# Patient Record
Sex: MALE | Race: WHITE | URBAN - METROPOLITAN AREA
[De-identification: names, ages, dates, MRNs, and addresses within clinical notes are randomized per-mention and may not be internally consistent; named-entity substitution may affect disease eponyms.]

---

## 2018-04-20 ENCOUNTER — HISTORICAL (OUTPATIENT)
Dept: ADMINISTRATIVE | Facility: HOSPITAL | Age: 63
End: 2018-04-20

## 2018-04-20 LAB
ALBUMIN SERPL-MCNC: 4.4 GM/DL (ref 3.4–5)
ALBUMIN/GLOB SERPL: 1.52 {RATIO} (ref 1.5–2.5)
ALP SERPL-CCNC: 66 UNIT/L (ref 38–126)
ALT SERPL-CCNC: 28 UNIT/L (ref 7–52)
AST SERPL-CCNC: 23 UNIT/L (ref 15–37)
BILIRUB SERPL-MCNC: 0.4 MG/DL (ref 0.2–1)
BILIRUBIN DIRECT+TOT PNL SERPL-MCNC: 0.1 MG/DL (ref 0–0.5)
BUN SERPL-MCNC: 17 MG/DL (ref 7–18)
CALCIUM SERPL-MCNC: 9.5 MG/DL (ref 8.5–10)
CHLORIDE SERPL-SCNC: 101 MMOL/L (ref 98–107)
CHOLEST SERPL-MCNC: 194 MG/DL (ref 0–200)
CHOLEST/HDLC SERPL: 5.2 {RATIO}
CO2 SERPL-SCNC: 29 MMOL/L (ref 21–32)
CREAT SERPL-MCNC: 1.13 MG/DL (ref 0.6–1.3)
CREAT/UREA NIT SERPL: 15
EST. AVERAGE GLUCOSE BLD GHB EST-MCNC: 117 MG/DL
GGT SERPL-CCNC: 70 UNIT/L (ref 5–85)
GLOBULIN SER-MCNC: 2.9 GM/DL (ref 1.2–3)
GLUCOSE SERPL-MCNC: 105 MG/DL (ref 74–106)
HBA1C MFR BLD: 5.7 % (ref 4.4–6.4)
HDLC SERPL-MCNC: 37 MG/DL (ref 35–60)
LDH SERPL-CCNC: 170 UNIT/L (ref 140–271)
LDLC SERPL CALC-MCNC: 104 MG/DL (ref 0–129)
POTASSIUM SERPL-SCNC: 4.8 MMOL/L (ref 3.5–5.1)
PROT SERPL-MCNC: 7.3 GM/DL (ref 6.4–8.2)
SODIUM SERPL-SCNC: 135 MMOL/L (ref 136–145)
TRIGL SERPL-MCNC: 258 MG/DL (ref 30–150)
VLDLC SERPL CALC-MCNC: 51.6 MG/DL

## 2018-05-23 ENCOUNTER — HISTORICAL (OUTPATIENT)
Dept: ADMINISTRATIVE | Facility: HOSPITAL | Age: 63
End: 2018-05-23

## 2018-10-17 ENCOUNTER — HISTORICAL (OUTPATIENT)
Dept: ADMINISTRATIVE | Facility: HOSPITAL | Age: 63
End: 2018-10-17

## 2018-10-17 LAB
APPEARANCE, UA: CLEAR
BACTERIA #/AREA URNS AUTO: NORMAL /HPF
BILIRUB UR QL STRIP: NEGATIVE MG/DL
COLOR UR: YELLOW
GLUCOSE (UA): NEGATIVE MG/DL
HGB UR QL STRIP: NEGATIVE UNIT/L
KETONES UR QL STRIP: NEGATIVE MG/DL
LEUKOCYTE ESTERASE UR QL STRIP: NEGATIVE UNIT/L
NITRITE UR QL STRIP.AUTO: NEGATIVE
PH UR STRIP: 7 [PH]
PROT UR QL STRIP: NEGATIVE MG/DL
RBC #/AREA URNS HPF: NORMAL /HPF
SP GR UR STRIP: 1.02
SQUAMOUS EPITHELIAL, UA: NORMAL /LPF
UROBILINOGEN UR STRIP-ACNC: 0.2 MG/DL
WBC #/AREA URNS AUTO: NORMAL /[HPF]

## 2018-10-26 ENCOUNTER — HISTORICAL (OUTPATIENT)
Dept: ADMINISTRATIVE | Facility: HOSPITAL | Age: 63
End: 2018-10-26

## 2018-10-26 LAB
ABS NEUT (OLG): 7.1
ALBUMIN SERPL-MCNC: 4.5 GM/DL (ref 3.4–5)
ALBUMIN/GLOB SERPL: 1.8 {RATIO} (ref 1.5–2.5)
ALP SERPL-CCNC: 60 UNIT/L (ref 38–126)
ALT SERPL-CCNC: 40 UNIT/L (ref 7–52)
APPEARANCE, UA: CLEAR
AST SERPL-CCNC: 28 UNIT/L (ref 15–37)
BACTERIA #/AREA URNS AUTO: ABNORMAL /HPF
BILIRUB SERPL-MCNC: 0.5 MG/DL (ref 0.2–1)
BILIRUB UR QL STRIP: NEGATIVE MG/DL
BILIRUBIN DIRECT+TOT PNL SERPL-MCNC: 0.1 MG/DL (ref 0–0.5)
BILIRUBIN DIRECT+TOT PNL SERPL-MCNC: 0.4 MG/DL
BUN SERPL-MCNC: 24 MG/DL (ref 7–18)
CALCIUM SERPL-MCNC: 9.8 MG/DL (ref 8.5–10)
CHLORIDE SERPL-SCNC: 102 MMOL/L (ref 98–107)
CHOLEST SERPL-MCNC: 203 MG/DL (ref 0–200)
CHOLEST/HDLC SERPL: 4.6 {RATIO}
CO2 SERPL-SCNC: 30 MMOL/L (ref 21–32)
COLOR UR: YELLOW
CREAT SERPL-MCNC: 1.13 MG/DL (ref 0.6–1.3)
ERYTHROCYTE [DISTWIDTH] IN BLOOD BY AUTOMATED COUNT: 14.1 % (ref 11.5–17)
EST. AVERAGE GLUCOSE BLD GHB EST-MCNC: 123 MG/DL
GLOBULIN SER-MCNC: 2.5 GM/DL (ref 1.2–3)
GLUCOSE (UA): NEGATIVE MG/DL
GLUCOSE SERPL-MCNC: 109 MG/DL (ref 74–106)
HBA1C MFR BLD: 5.9 % (ref 4.4–6.4)
HCT VFR BLD AUTO: 51.9 % (ref 42–52)
HDLC SERPL-MCNC: 44 MG/DL (ref 35–60)
HGB BLD-MCNC: 16.7 GM/DL (ref 14–18)
HGB UR QL STRIP: NEGATIVE UNIT/L
KETONES UR QL STRIP: NEGATIVE MG/DL
LDLC SERPL CALC-MCNC: 117 MG/DL (ref 0–129)
LEUKOCYTE ESTERASE UR QL STRIP: NEGATIVE UNIT/L
LYMPHOCYTES # BLD AUTO: 4 X10(3)/MCL (ref 0.6–3.4)
LYMPHOCYTES NFR BLD AUTO: 31.5 % (ref 13–40)
MCH RBC QN AUTO: 31.7 PG (ref 27–31.2)
MCHC RBC AUTO-ENTMCNC: 32 GM/DL (ref 32–36)
MCV RBC AUTO: 99 FL (ref 80–94)
MONOCYTES # BLD AUTO: 1.6 X10(3)/MCL (ref 0–1.8)
MONOCYTES NFR BLD AUTO: 12.3 % (ref 0.1–24)
NEUTROPHILS NFR BLD AUTO: 56.2 % (ref 47–80)
NITRITE UR QL STRIP.AUTO: NEGATIVE
PH UR STRIP: 6.5 [PH]
PLATELET # BLD AUTO: 302 X10(3)/MCL (ref 130–400)
PMV BLD AUTO: 8.8 FL
POTASSIUM SERPL-SCNC: 4.4 MMOL/L (ref 3.5–5.1)
PROT SERPL-MCNC: 7 GM/DL (ref 6.4–8.2)
PROT UR QL STRIP: ABNORMAL MG/DL
PSA SERPL-MCNC: 0.51 NG/ML (ref 0–4.5)
RBC # BLD AUTO: 5.26 X10(6)/MCL (ref 4.7–6.1)
RBC #/AREA URNS HPF: ABNORMAL /HPF
SODIUM SERPL-SCNC: 138 MMOL/L (ref 136–145)
SP GR UR STRIP: 1.02
SQUAMOUS EPITHELIAL, UA: ABNORMAL /LPF
TRIGL SERPL-MCNC: 242 MG/DL (ref 30–150)
TSH SERPL-ACNC: 0.59 MIU/ML (ref 0.35–4.94)
UROBILINOGEN UR STRIP-ACNC: 0.2 MG/DL
VLDLC SERPL CALC-MCNC: 48.4 MG/DL
WBC # SPEC AUTO: 12.7 X10(3)/MCL (ref 4.5–11.5)
WBC #/AREA URNS AUTO: ABNORMAL /[HPF]

## 2019-04-08 ENCOUNTER — HISTORICAL (OUTPATIENT)
Dept: ADMINISTRATIVE | Facility: HOSPITAL | Age: 64
End: 2019-04-08

## 2019-04-08 LAB
ABS NEUT (OLG): 6.8 X10(3)/MCL (ref 2.1–9.2)
ALBUMIN SERPL-MCNC: 4.3 GM/DL (ref 3.4–5)
ALBUMIN/GLOB SERPL: 2.15 {RATIO} (ref 1.5–2.5)
ALP SERPL-CCNC: 52 UNIT/L (ref 38–126)
ALT SERPL-CCNC: 35 UNIT/L (ref 7–52)
AST SERPL-CCNC: 29 UNIT/L (ref 15–37)
BILIRUB SERPL-MCNC: 0.4 MG/DL (ref 0.2–1)
BILIRUBIN DIRECT+TOT PNL SERPL-MCNC: 0.1 MG/DL (ref 0–0.5)
BILIRUBIN DIRECT+TOT PNL SERPL-MCNC: 0.3 MG/DL
BUN SERPL-MCNC: 21 MG/DL (ref 7–18)
CALCIUM SERPL-MCNC: 9.3 MG/DL (ref 8.5–10)
CHLORIDE SERPL-SCNC: 102 MMOL/L (ref 98–107)
CHOLEST SERPL-MCNC: 219 MG/DL (ref 0–200)
CHOLEST/HDLC SERPL: 6.4 {RATIO}
CO2 SERPL-SCNC: 31 MMOL/L (ref 21–32)
CREAT SERPL-MCNC: 1.11 MG/DL (ref 0.6–1.3)
ERYTHROCYTE [DISTWIDTH] IN BLOOD BY AUTOMATED COUNT: 13.9 % (ref 11.5–17)
EST. AVERAGE GLUCOSE BLD GHB EST-MCNC: 117 MG/DL
GLOBULIN SER-MCNC: 2 GM/DL (ref 1.2–3)
GLUCOSE SERPL-MCNC: 106 MG/DL (ref 74–106)
HBA1C MFR BLD: 5.7 % (ref 4.4–6.4)
HCT VFR BLD AUTO: 48.4 % (ref 42–52)
HDLC SERPL-MCNC: 34 MG/DL (ref 35–60)
HGB BLD-MCNC: 16.3 GM/DL (ref 14–18)
LDLC SERPL CALC-MCNC: 116 MG/DL (ref 0–129)
LYMPHOCYTES # BLD AUTO: 3.6 X10(3)/MCL (ref 0.6–3.4)
LYMPHOCYTES NFR BLD AUTO: 31.8 % (ref 13–40)
MCH RBC QN AUTO: 31.4 PG (ref 27–31.2)
MCHC RBC AUTO-ENTMCNC: 34 GM/DL (ref 32–36)
MCV RBC AUTO: 93 FL (ref 80–94)
MONOCYTES # BLD AUTO: 1 X10(3)/MCL (ref 0.1–1.3)
MONOCYTES NFR BLD AUTO: 9.1 % (ref 0.1–24)
NEUTROPHILS NFR BLD AUTO: 59.1 % (ref 47–80)
PLATELET # BLD AUTO: 316 X10(3)/MCL (ref 130–400)
PMV BLD AUTO: 8.8 FL (ref 9.4–12.4)
POTASSIUM SERPL-SCNC: 4.9 MMOL/L (ref 3.5–5.1)
PROT SERPL-MCNC: 6.3 GM/DL (ref 6.4–8.2)
RBC # BLD AUTO: 5.19 X10(6)/MCL (ref 4.7–6.1)
SODIUM SERPL-SCNC: 137 MMOL/L (ref 136–145)
TRIGL SERPL-MCNC: 336 MG/DL (ref 30–150)
VLDLC SERPL CALC-MCNC: 67.2 MG/DL
WBC # SPEC AUTO: 11.4 X10(3)/MCL (ref 4.5–11.5)

## 2019-06-11 ENCOUNTER — HISTORICAL (OUTPATIENT)
Dept: ADMINISTRATIVE | Facility: HOSPITAL | Age: 64
End: 2019-06-11

## 2019-06-11 LAB
ALBUMIN SERPL-MCNC: 4.2 GM/DL (ref 3.4–5)
ALBUMIN/GLOB SERPL: 1.56 {RATIO} (ref 1.5–2.5)
ALP SERPL-CCNC: 59 UNIT/L (ref 38–126)
ALT SERPL-CCNC: 23 UNIT/L (ref 7–52)
AST SERPL-CCNC: 22 UNIT/L (ref 15–37)
BILIRUB SERPL-MCNC: 0.4 MG/DL (ref 0.2–1)
BILIRUBIN DIRECT+TOT PNL SERPL-MCNC: 0.1 MG/DL (ref 0–0.5)
BILIRUBIN DIRECT+TOT PNL SERPL-MCNC: 0.3 MG/DL
BUN SERPL-MCNC: 20 MG/DL (ref 7–18)
CALCIUM SERPL-MCNC: 9.1 MG/DL (ref 8.5–10)
CHLORIDE SERPL-SCNC: 103 MMOL/L (ref 98–107)
CHOLEST SERPL-MCNC: 176 MG/DL (ref 0–200)
CHOLEST/HDLC SERPL: 4.2 {RATIO}
CO2 SERPL-SCNC: 28 MMOL/L (ref 21–32)
CREAT SERPL-MCNC: 1.14 MG/DL (ref 0.6–1.3)
GLOBULIN SER-MCNC: 2.7 GM/DL (ref 1.2–3)
GLUCOSE SERPL-MCNC: 110 MG/DL (ref 74–106)
HDLC SERPL-MCNC: 42 MG/DL (ref 35–60)
LDLC SERPL CALC-MCNC: 99 MG/DL (ref 0–129)
POTASSIUM SERPL-SCNC: 4.9 MMOL/L (ref 3.5–5.1)
PROT SERPL-MCNC: 6.9 GM/DL (ref 6.4–8.2)
SODIUM SERPL-SCNC: 137 MMOL/L (ref 136–145)
TRIGL SERPL-MCNC: 142 MG/DL (ref 30–150)
VLDLC SERPL CALC-MCNC: 28.4 MG/DL

## 2019-10-18 ENCOUNTER — HISTORICAL (OUTPATIENT)
Dept: ADMINISTRATIVE | Facility: HOSPITAL | Age: 64
End: 2019-10-18

## 2019-10-18 LAB
ABS NEUT (OLG): 6.3 X10(3)/MCL (ref 2.1–9.2)
ALBUMIN SERPL-MCNC: 4.4 GM/DL (ref 3.4–5)
ALBUMIN/GLOB SERPL: 1.57 {RATIO} (ref 1.5–2.5)
ALP SERPL-CCNC: 59 UNIT/L (ref 38–126)
ALT SERPL-CCNC: 27 UNIT/L (ref 7–52)
APPEARANCE, UA: CLEAR
AST SERPL-CCNC: 24 UNIT/L (ref 15–37)
BACTERIA #/AREA URNS AUTO: NORMAL /HPF
BILIRUB SERPL-MCNC: 0.4 MG/DL (ref 0.2–1)
BILIRUB UR QL STRIP: NEGATIVE MG/DL
BILIRUBIN DIRECT+TOT PNL SERPL-MCNC: 0.1 MG/DL (ref 0–0.5)
BILIRUBIN DIRECT+TOT PNL SERPL-MCNC: 0.3 MG/DL
BUN SERPL-MCNC: 22 MG/DL (ref 7–18)
CALCIUM SERPL-MCNC: 10 MG/DL (ref 8.5–10)
CHLORIDE SERPL-SCNC: 102 MMOL/L (ref 98–107)
CHOLEST SERPL-MCNC: 185 MG/DL (ref 0–200)
CHOLEST/HDLC SERPL: 4.6 {RATIO}
CO2 SERPL-SCNC: 30 MMOL/L (ref 21–32)
COLOR UR: YELLOW
CREAT SERPL-MCNC: 1.22 MG/DL (ref 0.6–1.3)
CREAT UR-MCNC: 300 MG/DL
ERYTHROCYTE [DISTWIDTH] IN BLOOD BY AUTOMATED COUNT: 14.2 % (ref 11.5–17)
EST. AVERAGE GLUCOSE BLD GHB EST-MCNC: 117 MG/DL
GLOBULIN SER-MCNC: 2.8 GM/DL (ref 1.2–3)
GLUCOSE (UA): NEGATIVE MG/DL
GLUCOSE SERPL-MCNC: 107 MG/DL (ref 74–106)
HBA1C MFR BLD: 5.7 % (ref 4.4–6.4)
HCT VFR BLD AUTO: 47.8 % (ref 42–52)
HDLC SERPL-MCNC: 40 MG/DL (ref 35–60)
HGB BLD-MCNC: 16.3 GM/DL (ref 14–18)
HGB UR QL STRIP: NEGATIVE UNIT/L
KETONES UR QL STRIP: NEGATIVE MG/DL
LDLC SERPL CALC-MCNC: 99 MG/DL (ref 0–129)
LEUKOCYTE ESTERASE UR QL STRIP: NEGATIVE UNIT/L
LYMPHOCYTES # BLD AUTO: 4 X10(3)/MCL (ref 0.6–3.4)
LYMPHOCYTES NFR BLD AUTO: 34.4 % (ref 13–40)
MCH RBC QN AUTO: 31.2 PG (ref 27–31.2)
MCHC RBC AUTO-ENTMCNC: 34 GM/DL (ref 32–36)
MCV RBC AUTO: 91 FL (ref 80–94)
MICROALBUMIN UR-MCNC: 30 MG/L
MICROALBUMIN/CREAT RATIO PNL UR: <30 MG/GM
MONOCYTES # BLD AUTO: 1.4 X10(3)/MCL (ref 0.1–1.3)
MONOCYTES NFR BLD AUTO: 12.3 % (ref 0.1–24)
NEUTROPHILS NFR BLD AUTO: 53.3 % (ref 47–80)
NITRITE UR QL STRIP.AUTO: NEGATIVE
PH UR STRIP: 6.5 [PH]
PLATELET # BLD AUTO: 294 X10(3)/MCL (ref 130–400)
PMV BLD AUTO: 8.9 FL (ref 9.4–12.4)
POTASSIUM SERPL-SCNC: 5.5 MMOL/L (ref 3.5–5.1)
PROT SERPL-MCNC: 7.2 GM/DL (ref 6.4–8.2)
PROT UR QL STRIP: NEGATIVE MG/DL
PSA SERPL-MCNC: 0.77 NG/ML (ref 0–4.5)
RBC # BLD AUTO: 5.23 X10(6)/MCL (ref 4.7–6.1)
RBC #/AREA URNS HPF: NORMAL /HPF
SODIUM SERPL-SCNC: 138 MMOL/L (ref 136–145)
SP GR UR STRIP: 1.02
SQUAMOUS EPITHELIAL, UA: NORMAL /LPF
TRIGL SERPL-MCNC: 267 MG/DL (ref 30–150)
TSH SERPL-ACNC: 0.78 MIU/ML (ref 0.35–4.94)
UROBILINOGEN UR STRIP-ACNC: 0.2 MG/DL
VLDLC SERPL CALC-MCNC: 53.4 MG/DL
WBC # SPEC AUTO: 11.7 X10(3)/MCL (ref 4.5–11.5)
WBC #/AREA URNS AUTO: NORMAL /[HPF]

## 2020-04-23 ENCOUNTER — HISTORICAL (OUTPATIENT)
Dept: ADMINISTRATIVE | Facility: HOSPITAL | Age: 65
End: 2020-04-23

## 2020-04-23 LAB
ALBUMIN SERPL-MCNC: 4.2 GM/DL (ref 3.4–5)
ALBUMIN/GLOB SERPL: 1.5 {RATIO} (ref 1.5–2.5)
ALP SERPL-CCNC: 60 UNIT/L (ref 38–126)
ALT SERPL-CCNC: 30 UNIT/L (ref 7–52)
AST SERPL-CCNC: 25 UNIT/L (ref 15–37)
BILIRUB SERPL-MCNC: 0.4 MG/DL (ref 0.2–1)
BILIRUBIN DIRECT+TOT PNL SERPL-MCNC: 0.1 MG/DL (ref 0–0.5)
BILIRUBIN DIRECT+TOT PNL SERPL-MCNC: 0.3 MG/DL
BUN SERPL-MCNC: 19 MG/DL (ref 7–18)
CALCIUM SERPL-MCNC: 9.2 MG/DL (ref 8.5–10)
CHLORIDE SERPL-SCNC: 101 MMOL/L (ref 98–107)
CHOLEST SERPL-MCNC: 171 MG/DL (ref 0–200)
CHOLEST/HDLC SERPL: 5 {RATIO}
CO2 SERPL-SCNC: 32 MMOL/L (ref 21–32)
CREAT SERPL-MCNC: 1.24 MG/DL (ref 0.6–1.3)
CREAT UR-MCNC: 200 MG/DL
DEPRECATED CALCIDIOL+CALCIFEROL SERPL-MC: 38.5 NG/ML (ref 30–80)
EST. AVERAGE GLUCOSE BLD GHB EST-MCNC: 117 MG/DL
GLOBULIN SER-MCNC: 2.8 GM/DL (ref 1.2–3)
GLUCOSE SERPL-MCNC: 103 MG/DL (ref 74–106)
HBA1C MFR BLD: 5.7 % (ref 4.4–6.4)
HDLC SERPL-MCNC: 34 MG/DL (ref 35–60)
LDLC SERPL CALC-MCNC: 87 MG/DL (ref 0–129)
MICROALBUMIN UR-MCNC: 10 MG/L
MICROALBUMIN/CREAT RATIO PNL UR: <30 MG/GM
POTASSIUM SERPL-SCNC: 4.3 MMOL/L (ref 3.5–5.1)
PROT SERPL-MCNC: 7 GM/DL (ref 6.4–8.2)
SODIUM SERPL-SCNC: 137 MMOL/L (ref 136–145)
TRIGL SERPL-MCNC: 255 MG/DL (ref 30–150)
VLDLC SERPL CALC-MCNC: 51 MG/DL

## 2020-07-23 ENCOUNTER — HISTORICAL (OUTPATIENT)
Dept: ADMINISTRATIVE | Facility: HOSPITAL | Age: 65
End: 2020-07-23

## 2020-10-20 ENCOUNTER — HISTORICAL (OUTPATIENT)
Dept: ADMINISTRATIVE | Facility: HOSPITAL | Age: 65
End: 2020-10-20

## 2020-10-20 LAB
ABS NEUT (OLG): 4.7 X10(3)/MCL (ref 2.1–9.2)
ALBUMIN SERPL-MCNC: 4.2 GM/DL (ref 3.4–5)
ALBUMIN/GLOB SERPL: 1.83 {RATIO} (ref 1.5–2.5)
ALP SERPL-CCNC: 59 UNIT/L (ref 38–126)
ALT SERPL-CCNC: 25 UNIT/L (ref 7–52)
APPEARANCE, UA: CLEAR
AST SERPL-CCNC: 21 UNIT/L (ref 15–37)
BACTERIA #/AREA URNS AUTO: NORMAL /HPF
BILIRUB SERPL-MCNC: 0.4 MG/DL (ref 0.2–1)
BILIRUB UR QL STRIP: NEGATIVE MG/DL
BILIRUBIN DIRECT+TOT PNL SERPL-MCNC: 0.1 MG/DL (ref 0–0.5)
BILIRUBIN DIRECT+TOT PNL SERPL-MCNC: 0.3 MG/DL
BUN SERPL-MCNC: 18 MG/DL (ref 7–18)
CALCIUM SERPL-MCNC: 9.3 MG/DL (ref 8.5–10.1)
CHLORIDE SERPL-SCNC: 102 MMOL/L (ref 98–107)
CHOLEST SERPL-MCNC: 158 MG/DL (ref 0–200)
CHOLEST/HDLC SERPL: 4.1 {RATIO}
CO2 SERPL-SCNC: 30 MMOL/L (ref 21–32)
COLOR UR: YELLOW
CREAT SERPL-MCNC: 1.01 MG/DL (ref 0.6–1.3)
CREAT UR-MCNC: 200 MG/DL
DEPRECATED CALCIDIOL+CALCIFEROL SERPL-MC: 62 NG/ML (ref 30–80)
ERYTHROCYTE [DISTWIDTH] IN BLOOD BY AUTOMATED COUNT: 13.9 % (ref 11.5–17)
EST. AVERAGE GLUCOSE BLD GHB EST-MCNC: 117 MG/DL
GLOBULIN SER-MCNC: 2.3 GM/DL (ref 1.2–3)
GLUCOSE (UA): NEGATIVE MG/DL
GLUCOSE SERPL-MCNC: 92 MG/DL (ref 74–106)
HBA1C MFR BLD: 5.7 % (ref 4.4–6.4)
HCT VFR BLD AUTO: 45.9 % (ref 42–52)
HDLC SERPL-MCNC: 39 MG/DL (ref 35–60)
HGB BLD-MCNC: 15.6 GM/DL (ref 14–18)
HGB UR QL STRIP: NEGATIVE UNIT/L
KETONES UR QL STRIP: NEGATIVE MG/DL
LDLC SERPL CALC-MCNC: 92 MG/DL (ref 0–129)
LEUKOCYTE ESTERASE UR QL STRIP: NEGATIVE UNIT/L
LYMPHOCYTES # BLD AUTO: 3.5 X10(3)/MCL (ref 0.6–3.4)
LYMPHOCYTES NFR BLD AUTO: 37.2 % (ref 13–40)
MCH RBC QN AUTO: 31.5 PG (ref 27–31.2)
MCHC RBC AUTO-ENTMCNC: 34 GM/DL (ref 32–36)
MCV RBC AUTO: 92 FL (ref 80–94)
MICROALBUMIN UR-MCNC: 10 MG/L
MICROALBUMIN/CREAT RATIO PNL UR: <30 MG/GM
MONOCYTES # BLD AUTO: 1.2 X10(3)/MCL (ref 0.1–1.3)
MONOCYTES NFR BLD AUTO: 12.7 % (ref 0.1–24)
NEUTROPHILS NFR BLD AUTO: 50.1 % (ref 47–80)
NITRITE UR QL STRIP.AUTO: NEGATIVE
PH UR STRIP: 6.5 [PH]
PLATELET # BLD AUTO: 296 X10(3)/MCL (ref 130–400)
PMV BLD AUTO: 8.8 FL (ref 9.4–12.4)
POTASSIUM SERPL-SCNC: 4.3 MMOL/L (ref 3.5–5.1)
PROT SERPL-MCNC: 6.5 GM/DL (ref 6.4–8.2)
PROT UR QL STRIP: NEGATIVE MG/DL
PSA SERPL-MCNC: 0.57 NG/ML (ref 0–4.5)
RBC # BLD AUTO: 4.96 X10(6)/MCL (ref 4.7–6.1)
RBC #/AREA URNS HPF: NORMAL /HPF
SODIUM SERPL-SCNC: 138 MMOL/L (ref 136–145)
SP GR UR STRIP: 1.02
SQUAMOUS EPITHELIAL, UA: NORMAL /LPF
TRIGL SERPL-MCNC: 196 MG/DL (ref 30–150)
TSH SERPL-ACNC: 0.35 MIU/ML (ref 0.35–4.94)
UROBILINOGEN UR STRIP-ACNC: 0.2 MG/DL
VLDLC SERPL CALC-MCNC: 39.2 MG/DL
WBC # SPEC AUTO: 9.4 X10(3)/MCL (ref 4.5–11.5)
WBC #/AREA URNS AUTO: NORMAL /[HPF]

## 2022-05-02 NOTE — HISTORICAL [facility]ERNER
This is a historical note converted from [place]. Formatting and pictures may have been removed.  Please reference Jos[facility] for original formatting and attached multimedia. Chief Complaint  LEFT FOOT PAIN/ANKLE SINCE YESTERDAY  History of Present Illness  As per CC. Knee buckled yesterday and left foot landed awkwardly, left foot developed sharp pain  Tramadol didnt help, usually takes 2-3 per day.  Review of Systems  See HPI  Physical Exam  Vitals & Measurements  HR:?76(Peripheral)? BP:?110/84?  HT:?175?cm? HT:?175?cm? WT:?98.6?kg? WT:?98.6?kg?  EXT:?2+ DP pulses bilaterally,?tender to palpation and mild edema of midfoot?on the left,?good capillary refill, no laceration, no?erythema or?warmth. ?Right foot exam within normal limits  Neuro: Intact bilateral lower extremities  X-ray left foot:?No fractures identified,?OA changes  Assessment/Plan  1.?Sprain of left foot  ?Increase Tramadol frequency as needed. Medrol Dosepack  2.?Osteoarthritis of left foot  ?Instructed on proper footwear.   Problem List/Past Medical History  Ongoing  Coronary artery disease  Depression  Hypertriglyceridemia  Obesity  Osteoarthritis  Osteoarthritis of left foot  Historical  No qualifying data  Medications  acetaminophen/butalbital/caffeine 325 mg-50 mg-40 mg oral tablet, 2 tab(s), Oral, q4hr, 5 refills  AMIODARONE  MG TABLET, 200 mg= 1 tab(s), Oral, Daily  atorvastatin 20 mg oral tablet, 20 mg= 1 tab(s), Oral, Daily, 5 refills  clonazePAM 1 mg oral tablet, 1 mg= 1 tab(s), Oral, BID, 3 refills  cyclobenzaprine 5 mg oral tablet, 10 mg= 2 tab(s), Oral, q8hr, 1 refills  HYDROCHLOROTHIAZIDE 25 MG TAB, 25 mg= 1 tab(s), Oral, Daily  LISINOPRIL 5 MG TABLET, 5 mg= 1 tab(s), Oral, Daily  METOPROLOL TARTRATE 25 MG TAB, 25 mg= 1 tab(s), Oral, BID  montelukast 10 mg oral TABLET, 10 mg= 1 tab(s), Oral, Daily, 5 refills  Allergies  No Known Medication Allergies

## 2022-05-02 NOTE — HISTORICAL [facility]
This is a historical note converted from [facility]. Formatting and pictures may have been removed.  Please reference Josy for original formatting and attached multimedia. Chief Complaint  Lt lower back pain, pulling feeling, shoulder and back more on right side x 1 week, wants flu shot  History of Present Illness  Patient woke 8 days ago with severe pain in Left lower back to flank from pelvis to ribs. Pain comes in wave pattern. No hx of kidney stones or abdominal?surgeries. Denies change in urination. Last BM 2 hours ago. Denies recent constipation.? No hx of lumbar DDD.????  ?taking Flexeril 10mg every 8 hours, helps for 4 hours.  Review of Systems  See HPI. ?Denies chest pain or?difficulty breathing. ?Denies constipation or diarrhea. ?Denies fever.? Denies urinary complaints.  Physical Exam  Vitals & Measurements  HR:?80(Peripheral)? BP:?128/89?  HT:?171?cm? HT:?171?cm? WT:?99.7?kg? WT:?99.7?kg?  General: Alert and oriented, stable on room air  Back:?No CVAT,?tender to palpation?left lumbar?area?and left flank  ABD:?Obese,?NABS,?no masses,?tender to palpation left flank, no guarding or rebound  CV: RRR, no murmur  Lungs: CTA B  ?  X-ray back:?No fracture, no evidence of DDD,?lumbar spondylosis  Urinalysis: No evidence of infection  Assessment/Plan  1.?Back pain  ?Administered 2 cc Celestone and 10 mg of Kenalog IM.? Prescribed Valium to use for muscle relaxer.? Instructed on posture and weight loss. ?Patient advised to call if fails to improve?or if worsens.  2.?Needs flu shot  Flu vaccine administered   Problem List/Past Medical History  Ongoing  Coronary artery disease  Depression  Hypertriglyceridemia  Obesity  Osteoarthritis  Osteoarthritis of left foot  Historical  No qualifying data  Medications  acetaminophen/butalbital/caffeine 325 mg-50 mg-40 mg oral tablet, 2 tab(s), Oral, q4hr, 5 refills  AMIODARONE  MG TABLET, 200 mg= 1 tab(s), Oral, Daily  atorvastatin 20 mg oral tablet, 20 mg= 1 tab(s), Oral,  Daily, 5 refills  CLONAZEPAM 0.5 MG TABLET, Oral, BID  clonazePAM 1 mg oral tablet, 1 mg= 1 tab(s), Oral, BID, 2 refills  cyclobenzaprine 5 mg oral tablet, See Instructions, 1 refills  HYDROCHLOROTHIAZIDE 25 MG TAB, 25 mg= 1 tab(s), Oral, Daily  LISINOPRIL 5 MG TABLET, 5 mg= 1 tab(s), Oral, Daily  METOPROLOL TARTRATE 25 MG TAB, 25 mg= 1 tab(s), Oral, BID  montelukast 10 mg oral TABLET, 10 mg= 1 tab(s), Oral, Daily, 5 refills  Norco 7.5 mg-325 mg oral tablet, 1 tab(s), Oral, QID, PRN  traMADol 50 mg oral tablet, 50 mg= 1 tab(s), Oral, q6hr  Valium 10 mg oral tablet, See Instructions  Allergies  No Known Medication Allergies  Immunizations  Vaccine Date Status Comments   influenza virus vaccine, inactivated - Not Given Expectation Not Necessary     influenza virus vaccine, inactivated 10/17/2018 Given    Health Maintenance  Health Maintenance  ???Pending?(in the next year)  ??? ??OverDue  ??? ? ? ?Coronary Artery Disease Maintenance-Lipid Lowering Therapy due??and every?  ??? ? ? ?Influenza Vaccine due??and every?  ??? ??Due?  ??? ? ? ?ADL Screening due??10/19/18??and every 1??year(s)  ??? ? ? ?Alcohol Misuse Screening due??10/19/18??and every 1??year(s)  ??? ? ? ?Aspirin Therapy for CVD Prevention due??10/19/18??and every 1??year(s)  ??? ? ? ?Colorectal Screening due??10/19/18??and every?  ??? ? ? ?Coronary Artery Disease Maintenance-Antiplatelet Agent Prescribed due??10/19/18??and every?  ??? ? ? ?Tetanus Vaccine due??10/19/18??and every 10??year(s)  ??? ? ? ?Zoster Vaccine due??10/19/18??and every 100??year(s)  ??? ??Due In Future?  ??? ? ? ?Obesity Screening not due until??04/24/19??and every 1??year(s)  ??? ? ? ?Blood Pressure Screening not due until??10/17/19??and every 1??year(s)  ??? ? ? ?Body Mass Index Check not due until??10/17/19??and every 1??year(s)  ???Satisfied?(in the past 1 year)  ??? ??Satisfied?  ??? ? ? ?Blood Pressure Screening on??10/17/18.??Satisfied by Elma Loomis  ??? ? ? ?Body Mass  Index Check on??04/24/18.??Satisfied by Kimberly Cole  ??? ? ? ?Coronary Artery Disease Maintenance-Lipid Lowering Therapy on??04/24/18.??Satisfied by MIHIR Ramirez Jr., MD  ??? ? ? ?Diabetes Screening on??04/20/18.??Satisfied by Luisito Cotter  ??? ? ? ?Influenza Vaccine on??10/17/18.??Satisfied by MIHIR Ramirez Jr., MD  ??? ? ? ?Lipid Screening on??04/20/18.??Satisfied by MIHIR Ramirez Jr., MD  ??? ? ? ?Obesity Screening on??04/24/18.??Satisfied by Kimberly Cole  ?  ?

## 2022-05-03 NOTE — HISTORICAL [facility]
This is a historical note converted from [facility]. Formatting and pictures may have been removed.  Please reference [facility] for original formatting and attached multimedia. Chief Complaint  BACK PAIN  History of Present Illness  Patient with severe pain from traps to bottoms of scapulae for a week. No known trauma.  Exacerbated with movement. Occasional wheeze. No fever.  Sees Dr Cleary every 4 months, last appt 2 weeks ago.? ECHO then was good.  Review of Systems  See HPI. ?Denies?fever. ?Denies?chest pain.? Denies GI or  complaints.  Physical Exam  Vitals & Measurements  HR:?71(Peripheral)? BP:?129/69?  HT:?170?cm? WT:?96.7?kg? BMI:?33.46?  General: Patient is alert and oriented  OP:?Moist mucosa  Neck : trapezius muscle spasm with tenderness  Back : rhomboid muscle spasm with tenderness.? No pustules or vesicles. No erythema.  CV: Regular rate and rhythm, no murmur  Lungs:?Rare wheeze. ?No rhonchi? or crackles, good air movement  ABD: Benign  ?  X-ray chest :? No infiltrates or effusions.  Assessment/Plan  1.?Upper back pain?M54.9  Prescribed Medrol Dosepak, Flexeril, and Norco 5-3 25, #90.? Advised to use correct posture?and begin back strengthening exercises.? Also encouraged to lose weight.? Would consider PT if he fails to improve.  2.?Bronchospasm?J98.01  ?He declines offer for prescription of albuterol.? He is advised to call if?symptoms worsen.  Referrals  Clinic Follow-up PRN, 07/23/20 14:07:00 CDT, [facility] - [facility], Future Order   Problem List/Past Medical History  Ongoing  Allergic rhinitis  Coronary artery disease  Depression  Elevated glucose  HTN (hypertension)  Hypertriglyceridemia  Macrocytosis  Migraine  Obesity  Osteoarthritis  Osteoarthritis of left foot  Prediabetes  Trapezius muscle spasm  Wellness examination  Historical  No qualifying data  Medications  acetaminophen/butalbital/caffeine 325 mg-50 mg-40 mg oral tablet, See Instructions  atorvastatin 40 mg oral tablet, See Instructions, 5  refills  clonazePAM 1 mg oral tablet, 1 mg= 1 tab(s), Oral, BID, 3 refills  cyclobenzaprine 5 mg oral tablet, See Instructions, 1 refills  hydrochlorothiazide 25 mg oral tablet, 25 mg= 1 tab(s), Oral, Daily, 5 refills  lisinopril 5 mg oral tablet, 5 mg= 1 tab(s), Oral, Daily, 5 refills  Medrol 4 mg oral tablet, 1 packet(s), Oral, As Directed  metoprolol tartrate 50 mg oral tab, 50 mg= 1 tab(s), Oral, BID, 5 refills  montelukast 10 mg oral TABLET, See Instructions, 5 refills  nitroglycerin 0.4 mg sublingual TAB, 0.4 mg= 1 tab(s), SL, q5min, 1 refills  Norco 5 mg-325 mg oral tablet, 1 tab(s), Oral, q4hr, PRN  traMADol 50 mg oral tablet, 50 mg= 1 tab(s), Oral, q6hr, PRN  Allergies  No Known Medication Allergies  Social History  Abuse/Neglect  No, 07/23/2020  No, 06/25/2020  No, 04/28/2020  No, 02/20/2020  No, 10/28/2019  No, 07/23/2019  Tobacco  Never (less than 100 in lifetime), N/A, 07/23/2020  Never (less than 100 in lifetime), No, 06/25/2020  Never (less than 100 in lifetime), No, 04/28/2020  Never (less than 100 in lifetime), N/A, 02/20/2020  Never (less than 100 in lifetime), N/A, 10/28/2019  Never (less than 100 in lifetime), N/A, 07/23/2019  Never (less than 100 in lifetime), N/A, 04/16/2019  Never smoker, N/A, 10/26/2018  Immunizations  Vaccine Date Status Comments   influenza virus vaccine, inactivated 10/28/2019 Given    influenza virus vaccine, inactivated - Not Given Expectation Not Necessary     influenza virus vaccine, inactivated 10/17/2018 Given    Health Maintenance  Health Maintenance  ???Pending?(in the next year)  ??? ??OverDue  ??? ? ? ?Influenza Vaccine due??and every?  ??? ? ? ?Alcohol Misuse Screening due??01/02/20??and every 1??year(s)  ??? ??Due?  ??? ? ? ?ADL Screening due??07/26/20??and every 1??year(s)  ??? ? ? ?Aspirin Therapy for CVD Prevention due??07/26/20??and every 1??year(s)  ??? ? ? ?Colorectal Screening due??07/26/20??and every?  ??? ? ? ?Coronary Artery Disease  Maintenance-Antiplatelet Agent Prescribed due??07/26/20??and every?  ??? ? ? ?Hypertension Management-Education due??07/26/20??and every 1??year(s)  ??? ? ? ?Tetanus Vaccine due??07/26/20??and every 10??year(s)  ??? ? ? ?Zoster Vaccine due??07/26/20??and every?  ??? ??Due In Future?  ??? ? ? ?Obesity Screening not due until??01/01/21??and every 1??year(s)  ??? ? ? ?Diabetes Screening not due until??04/23/21??and every 1??year(s)  ??? ? ? ?Hypertension Management-BMP not due until??04/23/21??and every 1??year(s)  ??? ? ? ?Blood Pressure Screening not due until??07/23/21??and every 1??year(s)  ??? ? ? ?Body Mass Index Check not due until??07/23/21??and every 1??year(s)  ??? ? ? ?Hypertension Management-Blood Pressure not due until??07/23/21??and every 1??year(s)  ???Satisfied?(in the past 1 year)  ??? ??Satisfied?  ??? ? ? ?Blood Pressure Screening on??07/23/20.??Satisfied by Elizabeth SHIRLEY Michelle S.  ??? ? ? ?Body Mass Index Check on??07/23/20.??Satisfied by Elizabeth SHIRLEY Michelle S.  ??? ? ? ?Coronary Artery Disease Maintenance-Lipid Lowering Therapy on??04/28/20.??Satisfied by Ashley Contreras ?HYACINTH PEREZ  ??? ? ? ?Diabetes Screening on??04/23/20.??Satisfied by Luisito Cotter  ??? ? ? ?Hypertension Management-Blood Pressure on??07/23/20.??Satisfied by Elizabeth SHIRLEY Michelle S.  ??? ? ? ?Influenza Vaccine on??10/28/19.??Satisfied by Shirley Capellan LPN  ??? ? ? ?Lipid Screening on??04/23/20.??Satisfied by Luisito Cotter  ??? ? ? ?Obesity Screening on??07/23/20.??Satisfied by Michelle Johnson CMA  ?

## 2022-12-31 ENCOUNTER — DOCUMENTATION ONLY (OUTPATIENT)
Dept: INTERNAL MEDICINE | Facility: CLINIC | Age: 67
End: 2022-12-31